# Patient Record
Sex: MALE | URBAN - METROPOLITAN AREA
[De-identification: names, ages, dates, MRNs, and addresses within clinical notes are randomized per-mention and may not be internally consistent; named-entity substitution may affect disease eponyms.]

---

## 2024-03-28 ENCOUNTER — HOSPITAL ENCOUNTER (OUTPATIENT)
Dept: RADIOLOGY | Facility: EXTERNAL LOCATION | Age: 49
Discharge: HOME | End: 2024-03-28

## 2024-03-28 DIAGNOSIS — M79.671 RIGHT FOOT PAIN: ICD-10-CM

## 2024-06-03 ENCOUNTER — HOSPITAL ENCOUNTER (OUTPATIENT)
Dept: RADIOLOGY | Facility: EXTERNAL LOCATION | Age: 49
Discharge: HOME | End: 2024-06-03

## 2024-06-03 DIAGNOSIS — M25.532 LEFT WRIST PAIN: ICD-10-CM

## 2025-05-23 ENCOUNTER — ANCILLARY PROCEDURE (OUTPATIENT)
Dept: URGENT CARE | Age: 50
End: 2025-05-23
Payer: COMMERCIAL

## 2025-05-23 ENCOUNTER — OFFICE VISIT (OUTPATIENT)
Dept: URGENT CARE | Age: 50
End: 2025-05-23
Payer: COMMERCIAL

## 2025-05-23 VITALS
DIASTOLIC BLOOD PRESSURE: 109 MMHG | WEIGHT: 153 LBS | HEART RATE: 69 BPM | SYSTOLIC BLOOD PRESSURE: 165 MMHG | OXYGEN SATURATION: 98 % | RESPIRATION RATE: 16 BRPM

## 2025-05-23 DIAGNOSIS — M79.672 LEFT FOOT PAIN: ICD-10-CM

## 2025-05-23 DIAGNOSIS — R03.0 ELEVATED BP WITHOUT DIAGNOSIS OF HYPERTENSION: Primary | ICD-10-CM

## 2025-05-23 PROCEDURE — 73630 X-RAY EXAM OF FOOT: CPT | Mod: LEFT SIDE | Performed by: NURSE PRACTITIONER

## 2025-05-23 RX ORDER — METHYLPREDNISOLONE 4 MG/1
TABLET ORAL
Qty: 21 TABLET | Refills: 0 | Status: SHIPPED | OUTPATIENT
Start: 2025-05-23

## 2025-05-23 ASSESSMENT — ENCOUNTER SYMPTOMS
CARDIOVASCULAR NEGATIVE: 1
COLOR CHANGE: 1
RESPIRATORY NEGATIVE: 1
PSYCHIATRIC NEGATIVE: 1
MYALGIAS: 1
JOINT SWELLING: 1
ARTHRALGIAS: 1
NEUROLOGICAL NEGATIVE: 1
CONSTITUTIONAL NEGATIVE: 1

## 2025-05-23 NOTE — PATIENT INSTRUCTIONS
Treating for suspected gout  Xray completed to r/o compression fracture since he runs 5-10 miles daily- was negative  Medrol dose pack  Discussed low purine diet/tart cherry juice  Given PCP referral  Elevated blood pressure.  Should monitor BP at home, if remaining >140/80 contact PCP.  If >180/80 go to ER.  Avoid products with pseudoephedrine and NSAIDS (Ibuprofen, Alleve).  Important to take routine prescribed medication

## 2025-05-23 NOTE — PROGRESS NOTES
"Subjective   Patient ID: Barrie Cohen is a 49 y.o. male. They present today with a chief complaint of Foot Pain (X6days, redness, left, ).    History of Present Illness  Left great metatarsal red and painful.  Saw a \"teledoc,\" earlier in the week was prescribed cephalexin but it is not helpful.  Has taken for 4 days.  Last night had a hard time sleeping d/t pain.  Took medrol in past for similar condition and it was helpful.  Does not have PCP, has not been worked up for gout.  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          Past Medical History  Allergies as of 05/23/2025    (No Known Allergies)       Prescriptions Prior to Admission[1]     Medical History[2]    Surgical History[3]         Review of Systems  Review of Systems   Constitutional: Negative.    Respiratory: Negative.     Cardiovascular: Negative.    Musculoskeletal:  Positive for arthralgias, joint swelling and myalgias. Negative for gait problem.   Skin:  Positive for color change (redness).   Neurological: Negative.    Psychiatric/Behavioral: Negative.                                    Objective    Vitals:    05/23/25 0851   BP: (!) 165/109   Pulse: 69   Resp: 16   SpO2: 98%   Weight: 69.4 kg (153 lb)     No LMP for male patient.    Physical Exam  Constitutional:       Appearance: Normal appearance.   Cardiovascular:      Rate and Rhythm: Normal rate and regular rhythm.      Pulses: Normal pulses.      Heart sounds: Normal heart sounds.   Pulmonary:      Effort: Pulmonary effort is normal.      Breath sounds: Normal breath sounds.   Musculoskeletal:      Left foot: Normal range of motion and normal capillary refill. Tenderness and bony tenderness (left great metatarsal) present. No swelling. Normal pulse.   Skin:     General: Skin is warm and dry.      Findings: Erythema present.   Neurological:      General: No focal deficit present.      Mental Status: He is alert and oriented to person, place, and time. "   Psychiatric:         Mood and Affect: Mood normal.         Behavior: Behavior normal.         Thought Content: Thought content normal.         Judgment: Judgment normal.         Procedures    Point of Care Test & Imaging Results from this visit  No results found for this visit on 05/23/25.   Imaging  XR foot left 3+ views  Result Date: 5/23/2025    No acute osseous abnormality.   MACRO None   Signed by: Samantha Minor 5/23/2025 9:18 AM Dictation workstation:   [id number]      Cardiology, Vascular, and Other Imaging  No other imaging results found for the past 2 days      Diagnostic study results (if any) were reviewed by DENYS Duncan.    Assessment/Plan   Allergies, medications, history, and pertinent labs/EKGs/Imaging reviewed by DENYS Duncan.     Medical Decision Making  Treating for suspected gout  Xray completed to r/o compression fracture since he runs 5-10 miles daily- was negative  Medrol dose pack  Discussed low purine diet/tart cherry juice  Given PCP referral  Elevated blood pressure.  Should monitor BP at home, if remaining >140/80 contact PCP.  If >180/80 go to ER.  Avoid products with pseudoephedrine and NSAIDS (Ibuprofen, Alleve).  Important to take routine prescribed medication     Orders and Diagnoses  Diagnoses and all orders for this visit:  Elevated BP without diagnosis of hypertension  Left foot pain  -     XR foot left 3+ views; Future  -     methylPREDNISolone (Medrol Dospak) 4 mg tablets; Take as directed on package.  -     Referral to Primary Care; Future  Other orders  -     Follow Up In Primary Care; Future      Medical Admin Record      Patient disposition: Home    Electronically signed by DENYS Duncan  9:34 AM           [1] (Not in a hospital admission)   [2] History reviewed. No pertinent past medical history.  [3] History reviewed. No pertinent surgical history.